# Patient Record
Sex: FEMALE | Race: WHITE
[De-identification: names, ages, dates, MRNs, and addresses within clinical notes are randomized per-mention and may not be internally consistent; named-entity substitution may affect disease eponyms.]

---

## 2022-04-28 ENCOUNTER — HOSPITAL ENCOUNTER (OUTPATIENT)
Dept: HOSPITAL 76 - LAB.N | Age: 42
Discharge: HOME | End: 2022-04-28
Attending: NURSE PRACTITIONER
Payer: COMMERCIAL

## 2022-04-28 DIAGNOSIS — E04.1: Primary | ICD-10-CM

## 2022-04-28 LAB
T4 FREE SERPL-MCNC: 1.46 NG/DL (ref 0.58–1.64)
TSH SERPL-ACNC: 0.11 UIU/ML (ref 0.34–5.6)

## 2022-04-28 PROCEDURE — 84443 ASSAY THYROID STIM HORMONE: CPT

## 2022-04-28 PROCEDURE — 36415 COLL VENOUS BLD VENIPUNCTURE: CPT

## 2022-04-28 PROCEDURE — 84439 ASSAY OF FREE THYROXINE: CPT

## 2022-06-08 ENCOUNTER — HOSPITAL ENCOUNTER (OUTPATIENT)
Dept: HOSPITAL 76 - DI | Age: 42
Discharge: HOME | End: 2022-06-08
Attending: NURSE PRACTITIONER
Payer: COMMERCIAL

## 2022-06-08 DIAGNOSIS — E04.2: Primary | ICD-10-CM

## 2022-06-09 NOTE — ULTRASOUND REPORT
PROCEDURE:  Head or Neck Soft Tissue

 

INDICATIONS:  LARGE CLINICAL THYROID

 

TECHNIQUE:  

Real-time scanning was performed of the thyroid gland, with image documentation.  

 

COMPARISON:  None

 

FINDINGS:  

Right:  Thyroid lobe measures 6.5 x 1.9 x 2.5 cm, and is heterogenous in echotexture.  

Left:  Thyroid lobe measures 6.0 x 1.3 x 2.3 cm, and is heterogenous in echotexture.  

Isthmus:  2.3  mm thick.  

 

Nodule number:  1

Location:  Right midpole

Size:  1.4 x 1.2 x 1.4   cm.  

Composition:  Predominately solid  

Echogenicity:  Isoechoic   

Shape:  wider than tall.

Margins:  Smooth/halo

Echogenic foci:  Microcalcifications

Total points:  6

ACR TI-RADS category:  TI-RADS 4. Moderately suspicious less than 1.5 cm. Recommend follow-up ultraso
und at 1, 2, 3, and 5 years

 

Nodule number:  2

Location:  Right mid/lower pole medially

Size:  1.5 x 0.9 x 1.2 cm.  

Composition:  Solid

Echogenicity:  Isoechoic 

Shape:  wider than tall.

Margins:  Smooth

Echogenic foci:  None 

Total points:  3 

ACR TI-RADS category:  TI-RADS 3. Mildly suspicious measuring 1.5 cm. Recommend follow-up ultrasound 
at 1, 2, 3, and 5 years.

 

Nodule number:  3

Location:  Right midpole medially

Size:  0.8 x 0.3 x 0.6 cm.  

Composition:   Solid

Echogenicity:  Isoechoic 

Shape:  wider than tall.

Margins:  Smooth 

Echogenic foci:  None 

Total points:  3 

ACR TI-RADS category:  TI-RADS 3. Mildly suspicious less than 1.5 cm.

 

Nodule number:  4

Location:  Left midpole medially

Size:  0.6 x 0.4 x 0.5 cm.  

Composition:  Cystic

Echogenicity:  Anechoic 

Shape:  wider than tall.

Margins:  Smooth 

Echogenic foci:  None 

Total points:  0 

ACR TI-RADS category:  TI-RADS 1. Benign.

 

IMPRESSION:   Recommend follow-up ultrasound of the right midpole and right mid/lower pole nodules at
 1, 2, 3, and 5 years.

 

ACR TI-RADS definitions and recommendations:  

TI-RADS 1 (benign): 0 points.  FNA not needed. 

TI-RADS 2 (not suspicious): 2 points.  FNA not needed. 

TI-RADS 3 (mildly suspicious): 3 points. 

 "FNA if 2.5 cm or larger, follow up if 1.5 cm or larger (at 1, 3, and 5 years). 

TI-RADS 4 (moderately suspicious): 4-6 points.  

 "FNA if 1.5 cm or larger, follow up if 1 cm or larger (at 1, 2, 3, and 5 years).  

TI-RADS 5 (highly suspicious): 7 points or more.  

 "FNA if 1 cm or larger, follow up if 0.5 cm or larger (every year for 5 years).  

 

Reviewed by: Barry Laboy on 6/9/2022 10:54 AM PDT

Approved by: Barry Laboy on 6/9/2022 10:54 AM PDT

 

 

Station ID:  [id number]

## 2022-07-08 ENCOUNTER — HOSPITAL ENCOUNTER (OUTPATIENT)
Dept: HOSPITAL 76 - LAB | Age: 42
Discharge: HOME | End: 2022-07-08
Attending: PHYSICIAN ASSISTANT
Payer: COMMERCIAL

## 2022-07-08 DIAGNOSIS — E05.90: Primary | ICD-10-CM

## 2022-07-08 DIAGNOSIS — K52.9: ICD-10-CM

## 2022-07-08 LAB
ALBUMIN DIAFP-MCNC: 3.8 G/DL (ref 3.2–5.5)
ALBUMIN/GLOB SERPL: 1 {RATIO} (ref 1–2.2)
ALP SERPL-CCNC: 86 IU/L (ref 42–121)
ALT SERPL W P-5'-P-CCNC: 52 IU/L (ref 10–60)
ANION GAP SERPL CALCULATED.4IONS-SCNC: 9 MMOL/L (ref 6–13)
AST SERPL W P-5'-P-CCNC: 30 IU/L (ref 10–42)
BASOPHILS NFR BLD AUTO: 0 10^3/UL (ref 0–0.1)
BASOPHILS NFR BLD AUTO: 0.4 %
BILIRUB BLD-MCNC: 0.5 MG/DL (ref 0.2–1)
BUN SERPL-MCNC: 14 MG/DL (ref 6–20)
CALCIUM UR-MCNC: 9.4 MG/DL (ref 8.5–10.3)
CHLORIDE SERPL-SCNC: 105 MMOL/L (ref 101–111)
CO2 SERPL-SCNC: 24 MMOL/L (ref 21–32)
CREAT SERPLBLD-SCNC: 0.8 MG/DL (ref 0.4–1)
CRP SERPL-MCNC: 2.1 MG/DL (ref 0–1)
EOSINOPHIL # BLD AUTO: 0.1 10^3/UL (ref 0–0.7)
EOSINOPHIL NFR BLD AUTO: 1.5 %
ERYTHROCYTE [DISTWIDTH] IN BLOOD BY AUTOMATED COUNT: 12.5 % (ref 12–15)
GFRSERPLBLD MDRD-ARVRAT: 78 ML/MIN/{1.73_M2} (ref 89–?)
GLOBULIN SER-MCNC: 3.8 G/DL (ref 2.1–4.2)
GLUCOSE SERPL-MCNC: 113 MG/DL (ref 70–100)
HCT VFR BLD AUTO: 43.4 % (ref 37–47)
HGB UR QL STRIP: 14.7 G/DL (ref 12–16)
LYMPHOCYTES # SPEC AUTO: 1.3 10^3/UL (ref 1.5–3.5)
LYMPHOCYTES NFR BLD AUTO: 19.6 %
MCH RBC QN AUTO: 29.2 PG (ref 27–31)
MCHC RBC AUTO-ENTMCNC: 33.9 G/DL (ref 32–36)
MCV RBC AUTO: 86.3 FL (ref 81–99)
MONOCYTES # BLD AUTO: 0.5 10^3/UL (ref 0–1)
MONOCYTES NFR BLD AUTO: 6.7 %
NEUTROPHILS # BLD AUTO: 4.8 10^3/UL (ref 1.5–6.6)
NEUTROPHILS # SNV AUTO: 6.7 X10^3/UL (ref 4.8–10.8)
NEUTROPHILS NFR BLD AUTO: 71.7 %
NRBC # BLD AUTO: 0 /100WBC
NRBC # BLD AUTO: 0 X10^3/UL
PDW BLD AUTO: 10.3 FL (ref 7.9–10.8)
PLATELET # BLD: 343 10^3/UL (ref 130–450)
POTASSIUM SERPL-SCNC: 3.9 MMOL/L (ref 3.5–5)
PROT SPEC-MCNC: 7.6 G/DL (ref 6.7–8.2)
RBC MAR: 5.03 10^6/UL (ref 4.2–5.4)
SODIUM SERPLBLD-SCNC: 138 MMOL/L (ref 135–145)
T3FREE SERPL-MCNC: 4.15 PG/ML (ref 2.5–3.9)
T4 FREE SERPL-MCNC: 1.9 NG/DL (ref 0.58–1.64)
TSH SERPL-ACNC: < 0.08 UIU/ML (ref 0.34–5.6)

## 2022-07-08 PROCEDURE — 84439 ASSAY OF FREE THYROXINE: CPT

## 2022-07-08 PROCEDURE — 80053 COMPREHEN METABOLIC PANEL: CPT

## 2022-07-08 PROCEDURE — 85025 COMPLETE CBC W/AUTO DIFF WBC: CPT

## 2022-07-08 PROCEDURE — 84443 ASSAY THYROID STIM HORMONE: CPT

## 2022-07-08 PROCEDURE — 84481 FREE ASSAY (FT-3): CPT

## 2022-07-08 PROCEDURE — 36415 COLL VENOUS BLD VENIPUNCTURE: CPT

## 2022-07-08 PROCEDURE — 86140 C-REACTIVE PROTEIN: CPT

## 2022-07-11 ENCOUNTER — HOSPITAL ENCOUNTER (OUTPATIENT)
Dept: HOSPITAL 76 - LAB.N | Age: 42
Discharge: HOME | End: 2022-07-11
Attending: PHYSICIAN ASSISTANT
Payer: COMMERCIAL

## 2022-07-11 DIAGNOSIS — K52.9: Primary | ICD-10-CM

## 2022-07-11 LAB — HEMOCCULT STL QL IA: NEGATIVE

## 2022-07-11 PROCEDURE — 83516 IMMUNOASSAY NONANTIBODY: CPT

## 2022-07-11 PROCEDURE — 86364 TISS TRNSGLTMNASE EA IG CLAS: CPT

## 2022-07-11 PROCEDURE — 83993 ASSAY FOR CALPROTECTIN FECAL: CPT

## 2022-07-11 PROCEDURE — 82274 ASSAY TEST FOR BLOOD FECAL: CPT

## 2022-07-11 PROCEDURE — 87329 GIARDIA AG IA: CPT

## 2022-07-11 PROCEDURE — 87177 OVA AND PARASITES SMEARS: CPT

## 2022-07-11 PROCEDURE — 87328 CRYPTOSPORIDIUM AG IA: CPT

## 2022-07-12 LAB
TTG IGA SER-ACNC: <2 U/ML (ref 0–3)
TTG IGG SER-ACNC: <2 U/ML (ref 0–5)

## 2023-06-17 ENCOUNTER — HOSPITAL ENCOUNTER (OUTPATIENT)
Dept: HOSPITAL 76 - LAB | Age: 43
Discharge: HOME | End: 2023-06-17
Attending: INTERNAL MEDICINE
Payer: COMMERCIAL

## 2023-06-17 ENCOUNTER — HOSPITAL ENCOUNTER (OUTPATIENT)
Dept: HOSPITAL 76 - RT | Age: 43
Discharge: HOME | End: 2023-06-17
Attending: INTERNAL MEDICINE
Payer: COMMERCIAL

## 2023-06-17 DIAGNOSIS — K58.8: Primary | ICD-10-CM

## 2023-06-17 DIAGNOSIS — Z79.899: ICD-10-CM

## 2023-06-17 DIAGNOSIS — R03.0: Primary | ICD-10-CM

## 2023-06-17 LAB
ALBUMIN DIAFP-MCNC: 3.7 G/DL (ref 3.2–5.5)
ALBUMIN/GLOB SERPL: 0.9 {RATIO} (ref 1–2.2)
ALP SERPL-CCNC: 98 IU/L (ref 42–121)
ALT SERPL W P-5'-P-CCNC: 43 IU/L (ref 10–60)
ANION GAP SERPL CALCULATED.4IONS-SCNC: 6 MMOL/L (ref 6–13)
AST SERPL W P-5'-P-CCNC: 24 IU/L (ref 10–42)
BILIRUB BLD-MCNC: 0.6 MG/DL (ref 0.2–1)
BUN SERPL-MCNC: 14 MG/DL (ref 6–20)
CALCIUM UR-MCNC: 9.3 MG/DL (ref 8.5–10.3)
CHLORIDE SERPL-SCNC: 109 MMOL/L (ref 101–111)
CHOLEST SERPL-MCNC: 191 MG/DL
CLARITY UR REFRACT.AUTO: CLEAR
CO2 SERPL-SCNC: 25 MMOL/L (ref 21–32)
CREAT SERPLBLD-SCNC: 0.6 MG/DL (ref 0.4–1)
ERYTHROCYTE [DISTWIDTH] IN BLOOD BY AUTOMATED COUNT: 12.3 % (ref 12–15)
GFRSERPLBLD MDRD-ARVRAT: 109 ML/MIN/{1.73_M2} (ref 89–?)
GLOBULIN SER-MCNC: 3.9 G/DL (ref 2.1–4.2)
GLUCOSE SERPL-MCNC: 111 MG/DL (ref 70–100)
GLUCOSE UR QL STRIP.AUTO: NEGATIVE MG/DL
HCT VFR BLD AUTO: 42.9 % (ref 37–47)
HDLC SERPL-MCNC: 44 MG/DL
HDLC SERPL: 4.3 {RATIO} (ref ?–4.4)
HGB UR QL STRIP: 13.8 G/DL (ref 12–16)
KETONES UR QL STRIP.AUTO: NEGATIVE MG/DL
LDLC SERPL CALC-MCNC: 113 MG/DL
LDLC/HDLC SERPL: 2.6 {RATIO} (ref ?–4.4)
MCH RBC QN AUTO: 27.5 PG (ref 27–31)
MCHC RBC AUTO-ENTMCNC: 32.2 G/DL (ref 32–36)
MCV RBC AUTO: 85.6 FL (ref 81–99)
MUCOUS THREADS URNS QL MICRO: (no result)
NEUTROPHILS # SNV AUTO: 5.6 X10^3/UL (ref 4.8–10.8)
NITRITE UR QL STRIP.AUTO: NEGATIVE
PDW BLD AUTO: 10.1 FL (ref 7.9–10.8)
PH UR STRIP.AUTO: 5.5 PH (ref 5–7.5)
PLATELET # BLD: 308 10^3/UL (ref 130–450)
POTASSIUM SERPL-SCNC: 4.1 MMOL/L (ref 3.5–5)
PROT SPEC-MCNC: 7.6 G/DL (ref 6.7–8.2)
PROT UR STRIP.AUTO-MCNC: NEGATIVE MG/DL
RBC # UR STRIP.AUTO: NEGATIVE /UL
RBC # URNS HPF: (no result) /HPF (ref 0–5)
RBC MAR: 5.01 10^6/UL (ref 4.2–5.4)
SODIUM SERPLBLD-SCNC: 140 MMOL/L (ref 135–145)
SP GR UR STRIP.AUTO: >=1.03 (ref 1–1.03)
SQUAMOUS URNS QL MICRO: (no result)
TRIGL P FAST SERPL-MCNC: 168 MG/DL
UROBILINOGEN UR QL STRIP.AUTO: (no result) E.U./DL
UROBILINOGEN UR STRIP.AUTO-MCNC: NEGATIVE MG/DL
VLDLC SERPL-SCNC: 34 MG/DL
WBC # UR MANUAL: (no result) /HPF (ref 0–5)

## 2023-06-17 PROCEDURE — 80061 LIPID PANEL: CPT

## 2023-06-17 PROCEDURE — 93005 ELECTROCARDIOGRAM TRACING: CPT

## 2023-06-17 PROCEDURE — 84443 ASSAY THYROID STIM HORMONE: CPT

## 2023-06-17 PROCEDURE — 80053 COMPREHEN METABOLIC PANEL: CPT

## 2023-06-17 PROCEDURE — 85027 COMPLETE CBC AUTOMATED: CPT

## 2023-06-17 PROCEDURE — 81001 URINALYSIS AUTO W/SCOPE: CPT

## 2023-06-17 PROCEDURE — 36415 COLL VENOUS BLD VENIPUNCTURE: CPT

## 2023-06-17 PROCEDURE — 83721 ASSAY OF BLOOD LIPOPROTEIN: CPT

## 2023-06-29 ENCOUNTER — HOSPITAL ENCOUNTER (OUTPATIENT)
Dept: HOSPITAL 76 - DI | Age: 43
Discharge: HOME | End: 2023-06-29
Attending: INTERNAL MEDICINE
Payer: COMMERCIAL

## 2023-06-29 DIAGNOSIS — E05.90: ICD-10-CM

## 2023-06-29 DIAGNOSIS — K80.20: ICD-10-CM

## 2023-06-29 DIAGNOSIS — E03.9: ICD-10-CM

## 2023-06-29 DIAGNOSIS — K58.8: ICD-10-CM

## 2023-06-29 DIAGNOSIS — R53.83: Primary | ICD-10-CM

## 2023-06-29 NOTE — ULTRASOUND REPORT
PROCEDURE:  Abdomen Complete

 

INDICATIONS:  IRRITABLE BOWEL SYNDROME, FATIUGE, THYROTOXICOSIS

 

TECHNIQUE:  

Real-time scanning was performed of the abdominal and retroperitoneal organs, with image documentatio
n.  

 

COMPARISON:  None.

 

FINDINGS:  

Liver:  The liver demonstrates diffusely increased echotexture without focal abnormalities which is c
onsistent with chronic hepatocellular disease/hepatic steatosis.

 

Gallbladder: Gallbladder contains a 1.8 cm mobile gallstone. No evidence for gallbladder wall thicken
ing. No pericholecystic fluid. Negative sonographic Roe's.

 

Biliary ducts:  Intrahepatic bile ducts are non-dilated.  Extrahepatic bile duct caliber measures 5 m
m.  Normal is 6-7 mm or less in diameter, or 10 mm or less post-cholecystectomy.  

 

Pancreas:  Visualized portions of the pancreas are sonographically normal.  

 

Spleen:  Spleen is normal in size and homogeneous in echotexture.  

 

Kidneys:  Kidneys are normal in size and echotexture.  Right kidney measures 10.1 cm long; left kidne
y measures 10.3 cm long.  No hydronephrosis or nephrolithiasis.  No solid masses. No complex renal cy
stic lesions which require follow-up.

 

Aorta:  Visualized aorta is normal in caliber at less than 3 cm.  

 

Iliacs:  Proximal common iliac arteries are normal in caliber at less than 2.5 cm.  

 

IVC:  Intrahepatic inferior vena cava is patent.  

 

Miscellaneous:  No free abdominal fluid.

 

 

IMPRESSION:  

Diffusely echogenic liver parenchyma likely related to hepatic steatosis versus other chronic hepatoc
ellular disease. Recommend clinical correlation.

 

Cholelithiasis without sonographic evidence for acute cholecystitis.

 

 

 

Reviewed by: Kwabena Greco MD on 6/29/2023 1:51 PM PDT

Approved by: Kwabena Greco MD on 6/29/2023 1:51 PM PDT

 

 

Station ID:  [id number]

## 2023-06-29 NOTE — ULTRASOUND REPORT
PROCEDURE:  Head or Neck Soft Tissue

 

INDICATIONS:  IRRITABLE BOWEL SYNDROME, FATIUGE, THYROTOXICOSIS

 

TECHNIQUE:  

Real-time scanning was performed of the thyroid gland, with image documentation.  

 

COMPARISON:  Thyroid ultrasound 6/8/2022

 

FINDINGS:  

Right:  Thyroid lobe measures 6.3 x 1.9 x 2.5 cm, and is homogeneous in echotexture.  

Left:  Thyroid lobe measures 5.7 x 1.4 x 1.9 cm, and is homogenous in echotexture.  

Isthmus:  0.3 cm thick.  

 

Nodule number:  One

Location:  Right mid

Size:  1.4 x 1.3 x 1.1 cm, not significantly changed  

Composition:  Solid (2 points).

Echogenicity:  Isoechoic (1 point).   

Shape:  wider than tall.

Margins:  Smooth (0 points).

Echogenic foci:  Macrocalcification (1 point).

Total points:  4

ACR TI-RADS category:  Moderately suspicious (4-6 points).

 

Nodule number:  Two

Location:  Right mid to lower pole

Size:  1.3 x 0.9 x 0.9 cm, not significantly changed  

Composition:  Predominantly solid (2 points).

Echogenicity:  Hyperechoic (1 point).   

Shape:  wider than tall.

Margins:  Smooth (0 points).

Echogenic foci:  None (0 points).

Total points:  3

ACR TI-RADS category:  Mildly suspicious.

 

Nodule number:  Three

Location:  Right mid thyroid medially

Size:  0.8 x 0.4 x 0.7 cm, not significant changed  

Composition:  Solid (2 points).

Echogenicity:  Hypoechoic (2 points).   

Shape:  wider than tall.

Margins:  Smooth (0 points).

Echogenic foci:  None (0 points).

Total points:  4

ACR TI-RADS category:  Moderately suspicious (4-6 points).

 

Nodule number:  Four

Location:  Left mid

Size:  0.6 x 0.3 x 0.5 cm, not significantly changed.  

Composition:  Cystic / almost completely cystic (0 points).

Echogenicity:  Anechoic (0 points).   

Shape:  wider than tall.

Margins:  Smooth (0 points).

Echogenic foci:  None (0 points).

Total points:  0

ACR TI-RADS category:  Benign (0 points).

 

IMPRESSION:  Stable bilateral thyroid nodules. Recommend continued imaging follow-up.

 

ACR TI-RADS definitions and recommendations:  

TI-RADS 1 (benign): 0 points.  FNA not needed. 

TI-RADS 2 (not suspicious): 2 points.  FNA not needed. 

TI-RADS 3 (mildly suspicious): 3 points. 

 "FNA if 2.5 cm or larger, follow up if 1.5 cm or larger (at 1, 3, and 5 years). 

TI-RADS 4 (moderately suspicious): 4-6 points.  

 "FNA if 1.5 cm or larger, follow up if 1 cm or larger (at 1, 2, 3, and 5 years).  

TI-RADS 5 (highly suspicious): 7 points or more.  

 "FNA if 1 cm or larger, follow up if 0.5 cm or larger (every year for 5 years).  

 

Reviewed by: Domingo Cramer MD on 6/29/2023 3:21 PM PDT

Approved by: Domingo Cramer MD on 6/29/2023 3:21 PM PDT

 

 

Station ID:  [id number]